# Patient Record
Sex: MALE | Race: WHITE | NOT HISPANIC OR LATINO | Employment: UNEMPLOYED | URBAN - METROPOLITAN AREA
[De-identification: names, ages, dates, MRNs, and addresses within clinical notes are randomized per-mention and may not be internally consistent; named-entity substitution may affect disease eponyms.]

---

## 2019-03-03 ENCOUNTER — TRANSFERRED RECORDS (OUTPATIENT)
Dept: HEALTH INFORMATION MANAGEMENT | Facility: CLINIC | Age: 49
End: 2019-03-03

## 2019-03-07 ENCOUNTER — TRANSFERRED RECORDS (OUTPATIENT)
Dept: HEALTH INFORMATION MANAGEMENT | Facility: CLINIC | Age: 49
End: 2019-03-07

## 2019-03-15 ENCOUNTER — TRANSFERRED RECORDS (OUTPATIENT)
Dept: HEALTH INFORMATION MANAGEMENT | Facility: CLINIC | Age: 49
End: 2019-03-15

## 2019-05-06 ENCOUNTER — TRANSFERRED RECORDS (OUTPATIENT)
Dept: HEALTH INFORMATION MANAGEMENT | Facility: CLINIC | Age: 49
End: 2019-05-06

## 2019-08-22 ENCOUNTER — TRANSFERRED RECORDS (OUTPATIENT)
Dept: HEALTH INFORMATION MANAGEMENT | Facility: CLINIC | Age: 49
End: 2019-08-22

## 2019-09-04 ENCOUNTER — TRANSFERRED RECORDS (OUTPATIENT)
Dept: HEALTH INFORMATION MANAGEMENT | Facility: CLINIC | Age: 49
End: 2019-09-04

## 2019-09-04 ENCOUNTER — MEDICAL CORRESPONDENCE (OUTPATIENT)
Dept: HEALTH INFORMATION MANAGEMENT | Facility: CLINIC | Age: 49
End: 2019-09-04

## 2019-09-10 ENCOUNTER — TRANSFERRED RECORDS (OUTPATIENT)
Dept: HEALTH INFORMATION MANAGEMENT | Facility: CLINIC | Age: 49
End: 2019-09-10

## 2019-09-11 ENCOUNTER — REFERRAL (OUTPATIENT)
Dept: TRANSPLANT | Facility: CLINIC | Age: 49
End: 2019-09-11

## 2019-09-11 DIAGNOSIS — K70.30 ALCOHOLIC CIRRHOSIS OF LIVER WITHOUT ASCITES (H): ICD-10-CM

## 2019-09-11 DIAGNOSIS — K70.30 CIRRHOSIS, ALCOHOLIC (H): ICD-10-CM

## 2019-09-11 DIAGNOSIS — B19.20 HEPATITIS C: ICD-10-CM

## 2019-09-11 NOTE — LETTER
9/23/19    Gerald Price  [address] [facility] [address]    Dear Gerald,    Thank you for your interest in the Transplant Center at [facility], [facility]. We look forward to being a part of your care team and assisting you through the transplant process.    As we discussed, your transplant coordinator is Kristy Angela (Liver).  You may call your coordinator at any time with questions or concerns call [phone].    Please complete the following.    1. Fill out and return the enclosed forms    Authorization for Electronic Communication    Authorization to Discuss Protected Health Information    Authorization for Release of Protected Health Information    2. Sign up for:    [employer], access to your electronic medical record (see enclosed pamphlet)    [employer], a transplant education website    You can use these tools to learn more about your transplant, communicate with your care team, and track your medical details      Sincerely,    Solid Organ Transplant  [facility], [facility]    cc: Terry Radford MD

## 2019-09-17 VITALS — WEIGHT: 138 LBS | BODY MASS INDEX: 22.18 KG/M2 | HEIGHT: 66 IN

## 2019-09-17 ASSESSMENT — MIFFLIN-ST. JEOR: SCORE: 1438.71

## 2019-09-17 NOTE — TELEPHONE ENCOUNTER
Patient was asked the following questions during liver intake call.     Referring Provider: Dr. Terry Radford   Referring Diagnosis:ETOH Cirrhosis   PCP: Dr. David Brian     1)Do you know why you have liver disease: Yes             If Alcoholic Cirrhosis is present when was your last drink: January 2019             Have you ever been through treatment for alcohol: Yes  2) Presence of Ascites: Pt states not in the past several months.  Paracentesis: No  3) Presence of Hepatic Encephalopathy: No Medications: Yes lactulose.   4) History of GI Bleeding: No  5) EGD: Yes at [facility]   6) Colonoscopy: No  7) MELD Score: No Labs   8) Insurance information: BCBS/Medicaid        Policy larsen: Self       Subscriber/policy/ID number: [id number]            Group Number: [id number]    Referral intake process completed.  Patient is aware that after financial approval is received, medical records will be requested.   Patient confirmed for a callback from transplant coordinator on 9/19/2019.  Tentative evaluation date TBD.    Confirmed coordinator will discuss evaluation process in more detail at the time of their call.   Patient is aware of the need to arrange age appropriate cancer screening, vaccinations, and dental care.  Reminded patient to complete questionnaire, complete medical records release, and review packet prior to evaluation visit .  Assessed patient for special needs (ie--wheelchair, assistance, guardian, and ):  No  Patient instructed to call [phone] with questions.     KIM Heath, LPN   Solid Organ Transplant

## 2019-09-20 NOTE — TELEPHONE ENCOUNTER
"Referred by: [facility], [facility]    Recent hospitalization: 1/2/2019 GIB, 1/23-GIB, 1/26 hyponatremia/ascites, 1/28 anxiety, hyponatremia left AMA, 2/16 ascites, 3/3 ascites    47 yo with alcoholic/HCV cirrhosis who reports diagnosed in 1/2019 and quit ETOH then. 3 wks HCV treatment. No CD or AA.      MELD 24 in 3/2019, this month 14-16  Bili T 3.2-5.1, INR 1.3, normal kidney funciton    Ascites - +  Taps - last para in 3/2019  HE - +  GIB - +  EGD - \"EGD done 1/6 showing some portal hypertensive gastropathy.  Colonoscopy - 1/23/19  \"flex sig which showed prominent internal hemorrhoids\"      and re .     PMH:  anxiety, RLS, hand-squamous cell     Surgical History: carpel tunnel    Nicotine:  +          Plan: soft evaluation, patient is hopeful is continues to improve and not ready for a transplant evaluation.      Contacted patient and introduced myself as their Transplant Coordinator, also introduced the role of the Transplant Coordinator in the transplant process.  Explained the purpose of this call including reviewing next steps and answering questions.    "

## 2019-09-24 ENCOUNTER — DOCUMENTATION ONLY (OUTPATIENT)
Dept: TRANSPLANT | Facility: CLINIC | Age: 49
End: 2019-09-24

## 2019-09-24 ENCOUNTER — ANCILLARY PROCEDURE (OUTPATIENT)
Dept: ULTRASOUND IMAGING | Facility: CLINIC | Age: 49
End: 2019-09-24
Attending: INTERNAL MEDICINE
Payer: COMMERCIAL

## 2019-09-24 ENCOUNTER — COMMITTEE REVIEW (OUTPATIENT)
Dept: TRANSPLANT | Facility: CLINIC | Age: 49
End: 2019-09-24

## 2019-09-24 ENCOUNTER — ALLIED HEALTH/NURSE VISIT (OUTPATIENT)
Dept: TRANSPLANT | Facility: CLINIC | Age: 49
End: 2019-09-24
Attending: INTERNAL MEDICINE
Payer: COMMERCIAL

## 2019-09-24 ENCOUNTER — OFFICE VISIT (OUTPATIENT)
Dept: GASTROENTEROLOGY | Facility: CLINIC | Age: 49
End: 2019-09-24
Attending: INTERNAL MEDICINE
Payer: COMMERCIAL

## 2019-09-24 ENCOUNTER — TELEPHONE (OUTPATIENT)
Dept: TRANSPLANT | Facility: CLINIC | Age: 49
End: 2019-09-24

## 2019-09-24 VITALS
TEMPERATURE: 98.1 F | WEIGHT: 142 LBS | HEIGHT: 66 IN | OXYGEN SATURATION: 98 % | DIASTOLIC BLOOD PRESSURE: 74 MMHG | HEART RATE: 69 BPM | SYSTOLIC BLOOD PRESSURE: 111 MMHG | BODY MASS INDEX: 22.82 KG/M2

## 2019-09-24 DIAGNOSIS — B19.20 HEPATITIS C: ICD-10-CM

## 2019-09-24 DIAGNOSIS — K74.60 CIRRHOSIS OF LIVER (H): Primary | ICD-10-CM

## 2019-09-24 DIAGNOSIS — K70.30 ALCOHOLIC CIRRHOSIS OF LIVER WITHOUT ASCITES (H): ICD-10-CM

## 2019-09-24 DIAGNOSIS — K70.30 ALCOHOLIC CIRRHOSIS OF LIVER WITHOUT ASCITES (H): Primary | ICD-10-CM

## 2019-09-24 DIAGNOSIS — K70.30 CIRRHOSIS, ALCOHOLIC (H): ICD-10-CM

## 2019-09-24 DIAGNOSIS — B18.2 CHRONIC HEPATITIS C WITHOUT HEPATIC COMA (H): ICD-10-CM

## 2019-09-24 LAB
ABO + RH BLD: NORMAL
ABO + RH BLD: NORMAL
AFP SERPL-MCNC: 33.1 UG/L (ref 0–8)
ALBUMIN SERPL-MCNC: 2.8 G/DL (ref 3.4–5)
ALBUMIN UR-MCNC: NEGATIVE MG/DL
ALP SERPL-CCNC: 127 U/L (ref 40–150)
ALT SERPL W P-5'-P-CCNC: 44 U/L (ref 0–70)
ANION GAP SERPL CALCULATED.3IONS-SCNC: 6 MMOL/L (ref 3–14)
APPEARANCE UR: CLEAR
AST SERPL W P-5'-P-CCNC: 50 U/L (ref 0–45)
BILIRUB DIRECT SERPL-MCNC: 1.3 MG/DL (ref 0–0.2)
BILIRUB SERPL-MCNC: 2.2 MG/DL (ref 0.2–1.3)
BILIRUB UR QL STRIP: NEGATIVE
BLD GP AB SCN SERPL QL: NORMAL
BLOOD BANK CMNT PATIENT-IMP: NORMAL
BUN SERPL-MCNC: 12 MG/DL (ref 7–30)
CALCIUM SERPL-MCNC: 8.4 MG/DL (ref 8.5–10.1)
CHLORIDE SERPL-SCNC: 107 MMOL/L (ref 94–109)
CO2 SERPL-SCNC: 25 MMOL/L (ref 20–32)
COLOR UR AUTO: YELLOW
CREAT SERPL-MCNC: 0.63 MG/DL (ref 0.66–1.25)
ERYTHROCYTE [DISTWIDTH] IN BLOOD BY AUTOMATED COUNT: 17.6 % (ref 10–15)
GFR SERPL CREATININE-BSD FRML MDRD: >90 ML/MIN/{1.73_M2}
GLUCOSE SERPL-MCNC: 94 MG/DL (ref 70–99)
GLUCOSE UR STRIP-MCNC: NEGATIVE MG/DL
HCT VFR BLD AUTO: 34.7 % (ref 40–53)
HGB BLD-MCNC: 11.9 G/DL (ref 13.3–17.7)
HGB UR QL STRIP: NEGATIVE
INR PPP: 1.56 (ref 0.86–1.14)
KETONES UR STRIP-MCNC: 5 MG/DL
LEUKOCYTE ESTERASE UR QL STRIP: NEGATIVE
MCH RBC QN AUTO: 33.1 PG (ref 26.5–33)
MCHC RBC AUTO-ENTMCNC: 34.3 G/DL (ref 31.5–36.5)
MCV RBC AUTO: 97 FL (ref 78–100)
MUCOUS THREADS #/AREA URNS LPF: PRESENT /LPF
NITRATE UR QL: NEGATIVE
PH UR STRIP: 6 PH (ref 5–7)
PLATELET # BLD AUTO: 70 10E9/L (ref 150–450)
POTASSIUM SERPL-SCNC: 4.6 MMOL/L (ref 3.4–5.3)
PROT SERPL-MCNC: 6.5 G/DL (ref 6.8–8.8)
RBC # BLD AUTO: 3.59 10E12/L (ref 4.4–5.9)
RBC #/AREA URNS AUTO: <1 /HPF (ref 0–2)
SODIUM SERPL-SCNC: 138 MMOL/L (ref 133–144)
SOURCE: ABNORMAL
SP GR UR STRIP: 1.02 (ref 1–1.03)
SPECIMEN EXP DATE BLD: NORMAL
UROBILINOGEN UR STRIP-MCNC: 0 MG/DL (ref 0–2)
WBC # BLD AUTO: 5.8 10E9/L (ref 4–11)
WBC #/AREA URNS AUTO: <1 /HPF (ref 0–5)

## 2019-09-24 PROCEDURE — 86900 BLOOD TYPING SEROLOGIC ABO: CPT | Performed by: INTERNAL MEDICINE

## 2019-09-24 PROCEDURE — 86901 BLOOD TYPING SEROLOGIC RH(D): CPT | Performed by: INTERNAL MEDICINE

## 2019-09-24 PROCEDURE — 80076 HEPATIC FUNCTION PANEL: CPT | Performed by: INTERNAL MEDICINE

## 2019-09-24 PROCEDURE — 82105 ALPHA-FETOPROTEIN SERUM: CPT | Performed by: INTERNAL MEDICINE

## 2019-09-24 PROCEDURE — 81001 URINALYSIS AUTO W/SCOPE: CPT | Performed by: INTERNAL MEDICINE

## 2019-09-24 PROCEDURE — 85027 COMPLETE CBC AUTOMATED: CPT | Performed by: INTERNAL MEDICINE

## 2019-09-24 PROCEDURE — 86850 RBC ANTIBODY SCREEN: CPT | Performed by: INTERNAL MEDICINE

## 2019-09-24 PROCEDURE — 80048 BASIC METABOLIC PNL TOTAL CA: CPT | Performed by: INTERNAL MEDICINE

## 2019-09-24 PROCEDURE — 85610 PROTHROMBIN TIME: CPT | Performed by: INTERNAL MEDICINE

## 2019-09-24 PROCEDURE — 86886 COOMBS TEST INDIRECT TITER: CPT | Performed by: INTERNAL MEDICINE

## 2019-09-24 PROCEDURE — 80307 DRUG TEST PRSMV CHEM ANLYZR: CPT | Performed by: INTERNAL MEDICINE

## 2019-09-24 ASSESSMENT — MIFFLIN-ST. JEOR: SCORE: 1456.86

## 2019-09-24 ASSESSMENT — PAIN SCALES - GENERAL: PAINLEVEL: NO PAIN (0)

## 2019-09-24 NOTE — COMMITTEE REVIEW
Abdominal Committee Review Note     Evaluation Date: 9/23/2019  Committee Review Date: 9/24/2019    Organ being evaluated for: Liver    Transplant Phase: Evaluation  Transplant Status: Active    Transplant Coordinator: Kristy Angela  Transplant Surgeon:       Referring Physician: Terry aRdford    Primary Diagnosis: Alcoholic Cirrhosis  Secondary Diagnosis:     Committee Review Members  Yolanda Ibrahim, RD   Pharmacy Kunal Aguayo, [facility]    - Clinical Malcom Willingham, NELA, Rina Armas, [facility]   Transplant Royce Mcgarry MD, Jr Cyril Allen, JEREMIAH, Ramandeep West, APRN CNP, Kristy Angela, RN, Hugo Vincent MD, Thomas Howell MD, Evelyne Pantoja, JEREMIAH, Kayden Gonzalez MD, Thomas M. Leventhal, MD, Chip Peterson MD       Transplant Eligibility: Cirrhosis with MELD, ETOH    Committee Review Decision: Declined    Relative Contraindications: patient not interested in transplant evaluation at this time.. MELD improving and well compensated.    Absolute Contraindications: None    Committee Chair Hugo Vincent MD verbally attested to the committee's decision.    Committee Discussion Details:   MRI, AFP increasing  Close transplant evaluation

## 2019-09-24 NOTE — LETTER
9/24/2019       RE: Gerald Price  [address] [facility] [address]     Dear Colleague,    Thank you for referring your patient, Gerald Price, to the [facility] HEPATOLOGY at [facility]. Please see a copy of my visit note below.    [facility]    Hepatology New Patient Visit    Referring provider:  Referred Self      CONSULTING HEALTHCARE PROFESSIONAL:  Terry Radford MD, [facility]       CHIEF COMPLAINT/REASON FOR VISIT:  Chronic liver disease related with alcohol and hepatitis C.      HISTORY OF PRESENT ILLNESS:  Mr. Gerald Price is a 48-year-old white male with a history of polysubstance abuse.  In 01/2019, he presented with a GI bleed.  At that time he was admitted to the hospital, and in fact, he did not have any esophageal varices, but had some portal hypertensive gastropathy.  He also had a flexible sigmoidoscopy that was done at the same time, and it showed that he had prominent hemorrhoids.  Otherwise, no other source of bleeding was identified.  He also later in that initial presentation developed ascites and fluid retention.  He did require therapeutic paracentesis.  As he abstained from alcohol, he improved.  According to him and his significant other, it appears that he was sick the first part of the year until April.  He did have, also, during his first admission there an altered mental status.  He was intubated.  He had withdrawals and had even seizures, and he was in the ICU.  After April, he improved clinically.  His fluid retention decreased, and currently he is doing quite well with no ascites that can be elicited with the physical examination; in fact, his ultrasound now done here does not show any fluid, and the Dopplers do not show any portal vein thrombosis.      As far as other symptoms are concerned, he denies today any significant abdominal pain.  He has no nausea or vomiting.  His weight has remained  "stable.  He is moving his bowels like 3 times a day, and he insists that he is not drinking.     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       Past Surgical History:   Procedure Laterality Date     ORTHOPEDIC SURGERY      carpel tunnel          Medications  Prior to Admission medications    Not on File       Allergies  Allergies   Allergen Reactions     Naproxen Rash       Family hx Social hx   Family History   Problem Relation Age of Onset     Multiple myeloma Mother      Breast Cancer Mother      Cirrhosis Father       Social History     Tobacco Use     Smoking status: Current Every Day Smoker     Packs/day: 20.00     Types: Cigarettes     Smokeless tobacco: Never Used     Tobacco comment: 0.5 daily    Substance Use Topics     Alcohol use: Not Currently     Comment: Sober since 1/2/2019     Drug use: Yes     Types: Marijuana     Comment: 1-2 week           REVIEW OF SYSTEMS:  Mr. Price denies having any recent infections.  He has significant fatigue.  No headaches.  He had seizures when he was in the hospital in January, at which time he had what appeared to be alcoholic hepatitis.  He denies any cough or shortness of breath or chest pain.  He has some anemia and easily bruises.  He has alcohol abuse and he had other polysubstance abuse also.  He has no diabetes.  No thyroid issues.  He has no eye issues.  He has bilateral hearing loss.  He denies any skin issue, except that his skin is darker, most likely related with him doing a lot of outdoor work.     Examination  /74   Pulse 69   Temp 98.1  F (36.7  C) (Oral)   Ht 1.676 m (5' 6\")   Wt 64.4 kg (142 lb)   SpO2 98%   BMI 22.92 kg/m     Body mass index is 22.92 kg/m .    Gen- " well, NAD, A+Ox3, normal color  Eye- EOMI  ENT- MMM, normal oropharynx  Lym- no palpable lymphadenopathy  CVS- S1, S2 normal, no added sounds, RRR  RS- CTA  Abd- Not distended. No shifting dullness.  Extr- pulses good, no GLADYS  MS- hands normal- no clubbing  Neuro- A+Ox3, no asterixis  Skin- no rash or jaundice  Psych- normal mood    Laboratory  Lab Results   Component Value Date     09/24/2019    POTASSIUM 4.6 09/24/2019    CHLORIDE 107 09/24/2019    CO2 25 09/24/2019    BUN 12 09/24/2019    CR 0.63 09/24/2019       Lab Results   Component Value Date    BILITOTAL 2.2 09/24/2019    ALT 44 09/24/2019    AST 50 09/24/2019    ALKPHOS 127 09/24/2019       Lab Results   Component Value Date    ALBUMIN 2.8 09/24/2019    PROTTOTAL 6.5 09/24/2019        Lab Results   Component Value Date    WBC 5.8 09/24/2019    HGB 11.9 09/24/2019    MCV 97 09/24/2019    PLT 70 09/24/2019       Lab Results   Component Value Date    INR 1.56 09/24/2019         Radiology    ASSESSMENT AND PLAN:  Cirrhosis of the liver related with hepatitis C and alcohol.        Mr. Price in January when he presented with acute decompensation of his liver disease, in my opinion, it might have been mostly hepatitis related with alcohol.  As he abstained from alcohol, he improved.  He had fluid retention and required therapeutic paracentesis, which is no longer the case.  Although his imaging shows that he has cirrhosis of the liver, he on endoscopy did not have esophageal varices. His MELD score is 15 now.  It was much higher when he was initially seen.  Another observation is that he had a history of polysubstance abuse, and he did acquire the hepatitis C related with that.  In fact, he is currently on Epclusa, which is sofosbuvir and velpatasvir, and he  has done so far  a month.  We expect him to respond.  I do not have his genotype, but the medication is pangenotypic.  He will need to do, also, a chemical dependency evaluation and treatment.  As far  as transplantation is concerned, he is only 50.  His MELD score is only 15.  We explained to him what that entails and that his MELD score should be higher to be listed and that he will go through a complete process of evaluation, including seeing Cardiology.  He wants to postpone.  He can still follow with Dr. Terry Radford first of all for finishing his hepatitis C treatment and checking if he achieves a sustained virological response, and this might also lead to further improvement in his liver disease.  Besides that, he will need to do treatment for his alcoholism.  He needs to quit tobacco, as he has smoked from childhood and has smoked 2 packs per day for quite a long time, so this needs to be addressed by Dr. Terry Radford.  He had a mild elevation of his alpha fetoproteins, and we do not have anything to compare with.  I am not expecting him to have any cancer, but we will get at least a baseline MRI with contrast, which we have already ordered.  His ultrasound from today did not show any focal lesions.      This was a 1 hour visit, of which more than 50% was spent in explaining to the patient what our plan of care was.  We answered all his questions.  He will follow with Dr. Terry Radford.      cc:     Terry Radford MD    [facility]   [address] [place] [id number]     Hugo Vincent MD  Hepatology  [facility]        Again, thank you for allowing me to participate in the care of your patient.      Sincerely,    Hugo Vincent MD

## 2019-09-24 NOTE — PROGRESS NOTES
Left message with referring provider, Dr. Radford, that patient is not interested in transplant at this time with an improving MELD of 15. Plan for patient to follow up with Dr. Vincent in 6 months. He did have a AFP done which was 33.1. Contact information provided if provider has any questions or concerns.

## 2019-09-24 NOTE — PROGRESS NOTES
Outpatient MNT: Liver Transplant Evaluation    Current BMI: 22.3 (HT 66 in,  lbs/63 kg)  BMI is within criteria of <40 for liver transplant     Time Spent: 15 minutes  Visit Type: Initial  Referring Physician: Carlton   Pt accompanied by: self     Medical dx associated with RD referral  - alcohol cirrhosis  - hep C     History of previous txp: none     Nutrition Assessment  Pt cooks for self. He reports not following any dietary restrictions (ie low sodium).     Appetite: improving, but not yet back to baseline    Vitamins, Supplements, Pertinent Meds: none currently   Herbal Medicines/Supplements: none     Diet Recall  Breakfast Fruit, bagel with CC and jelly    Lunch Occasional protein bar, fruit    Dinner Pizza, roast, fruit    Snacks Fruit    Beverages 40 oz milk/day, water, juice, 3-4 Body Armor/day (20 mg sodium each), makes smoothies with yogurt and fruit most days (dislikes protein powder)   Dining out A few times/week- fast food for lunch      Physical Activity  Works out (cardio; kick boxing) at least 1x/week      Anthropometrics  Height:   66 in   BMI:    22.3    Weight Status:Normal BMI   Weight:  138 lbs (9/17)            IBW (lb): 142  % IBW: 97    Wt Hx: Pt reports no edema (this has resolved). Pt reports losing weight earlier this year (Jan) after being sick, but has regained 75% of what he has lost. Weight per Care Everywhere of 164 lbs (1/2019). Difficult to assess dry weight vs fluid loss.     Adj/dosing BW: 138 lbs/63 kg         Malnutrition  % Intake: Decreased intake does not meet criteria for malnutrition   % Weight Loss: difficult to determine with ?prior fluid status   Subcutaneous Fat Loss: Mild  Muscle Loss: Mild  Fluid Accumulation/Edema: None noted  Malnutrition Diagnosis: Non-Severe malnutrition in the context of chronic illness     Estimated Nutrition Needs  Energy  [id number]     (25-30-35 kcal/kg for maintenance)     Protein  63-76    (1-1.2 g/kg for increased needs)            Fluid  1 ml/kcal or per MD        Micronutrient   Na+: <2000 mg/day            Nutrition Diagnosis  Food and nutrition related knowledge deficit r/t pre liver transplant eval AEB pt verbalized not hearing pre/post transplant diet guidelines.    Nutrition Intervention  Nutrition education provided:  Discussed sodium intake (low sodium foods and drinks, seasoning food without salt and tips for low sodium diet).    Reviewed adequate protein intake. Encouraged receiving protein from both animal and plant based sources.     Reviewed post txp diet guidelines in brief (will review in further detail post txp):  (1) Review of proper food safety measures d/t immunosuppressant therapy post-op and increased risk for food-borne illness    (2) Avoid the following post txp d/t risk for rejection, unknown effects on the organs, and/or potential interactions with immunosuppressants:  - Herbal, Chinese, holistic, chiropractic, natural, alternative medicines and supplements  - Detoxes and cleanses  - Weight loss pills  - Protein powders or other products with extracts or herbs (ie green tea extract)    (3) Med regimen and possible side effects    Patient Understanding: Pt verbalized understanding of education provided.  Expected Compliance: Good  Follow-Up Plans: PRN     Nutrition Goals  1. Limit Na+ <2000mg/day  2. Pt to verbalize understanding of 3 aspects of post txp education provided  3. Minimum of 63 g protein/day    Provided pt with contact info.   Lashay Ibrahim RD, LD  Pgr [phone]

## 2019-09-24 NOTE — PROGRESS NOTES
[facility]    Hepatology New Patient Visit    Referring provider:  Referred Self      CONSULTING HEALTHCARE PROFESSIONAL:  Terry Radford MD, [facility]       CHIEF COMPLAINT/REASON FOR VISIT:  Chronic liver disease related with alcohol and hepatitis C.      HISTORY OF PRESENT ILLNESS:  Mr. Gerald Price is a 48-year-old white male with a history of polysubstance abuse.  In 01/2019, he presented with a GI bleed.  At that time he was admitted to the hospital, and in fact, he did not have any esophageal varices, but had some portal hypertensive gastropathy.  He also had a flexible sigmoidoscopy that was done at the same time, and it showed that he had prominent hemorrhoids.  Otherwise, no other source of bleeding was identified.  He also later in that initial presentation developed ascites and fluid retention.  He did require therapeutic paracentesis.  As he abstained from alcohol, he improved.  According to him and his significant other, it appears that he was sick the first part of the year until April.  He did have, also, during his first admission there an altered mental status.  He was intubated.  He had withdrawals and had even seizures, and he was in the ICU.  After April, he improved clinically.  His fluid retention decreased, and currently he is doing quite well with no ascites that can be elicited with the physical examination; in fact, his ultrasound now done here does not show any fluid, and the Dopplers do not show any portal vein thrombosis.      As far as other symptoms are concerned, he denies today any significant abdominal pain.  He has no nausea or vomiting.  His weight has remained stable.  He is moving his bowels like 3 times a day, and he insists that he is not drinking.     Medical hx Surgical hx   Past Medical History:   Diagnosis Date     Alcoholic cirrhosis (H)      Anxiety      Ascites      Chronic hepatitis C with cirrhosis (H)     Hep C 1/2019      "Depressive disorder      GIB (gastrointestinal bleeding)      HE (hepatic encephalopathy) (H)      Squamous cell cancer of skin of hand       Past Surgical History:   Procedure Laterality Date     ORTHOPEDIC SURGERY      carpel tunnel          Medications  Prior to Admission medications    Not on File       Allergies  Allergies   Allergen Reactions     Naproxen Rash       Family hx Social hx   Family History   Problem Relation Age of Onset     Multiple myeloma Mother      Breast Cancer Mother      Cirrhosis Father       Social History     Tobacco Use     Smoking status: Current Every Day Smoker     Packs/day: 20.00     Types: Cigarettes     Smokeless tobacco: Never Used     Tobacco comment: 0.5 daily    Substance Use Topics     Alcohol use: Not Currently     Comment: Sober since 1/2/2019     Drug use: Yes     Types: Marijuana     Comment: 1-2 week           REVIEW OF SYSTEMS:  Mr. Price denies having any recent infections.  He has significant fatigue.  No headaches.  He had seizures when he was in the hospital in January, at which time he had what appeared to be alcoholic hepatitis.  He denies any cough or shortness of breath or chest pain.  He has some anemia and easily bruises.  He has alcohol abuse and he had other polysubstance abuse also.  He has no diabetes.  No thyroid issues.  He has no eye issues.  He has bilateral hearing loss.  He denies any skin issue, except that his skin is darker, most likely related with him doing a lot of outdoor work.     Examination  /74   Pulse 69   Temp 98.1  F (36.7  C) (Oral)   Ht 1.676 m (5' 6\")   Wt 64.4 kg (142 lb)   SpO2 98%   BMI 22.92 kg/m    Body mass index is 22.92 kg/m .    Gen- well, NAD, A+Ox3, normal color  Eye- EOMI  ENT- MMM, normal oropharynx  Lym- no palpable lymphadenopathy  CVS- S1, S2 normal, no added sounds, RRR  RS- CTA  Abd- Not distended. No shifting dullness.  Extr- pulses good, no GLADYS  MS- hands normal- no clubbing  Neuro- A+Ox3, no " asterixis  Skin- no rash or jaundice  Psych- normal mood    Laboratory  Lab Results   Component Value Date     09/24/2019    POTASSIUM 4.6 09/24/2019    CHLORIDE 107 09/24/2019    CO2 25 09/24/2019    BUN 12 09/24/2019    CR 0.63 09/24/2019       Lab Results   Component Value Date    BILITOTAL 2.2 09/24/2019    ALT 44 09/24/2019    AST 50 09/24/2019    ALKPHOS 127 09/24/2019       Lab Results   Component Value Date    ALBUMIN 2.8 09/24/2019    PROTTOTAL 6.5 09/24/2019        Lab Results   Component Value Date    WBC 5.8 09/24/2019    HGB 11.9 09/24/2019    MCV 97 09/24/2019    PLT 70 09/24/2019       Lab Results   Component Value Date    INR 1.56 09/24/2019         Radiology    ASSESSMENT AND PLAN:  Cirrhosis of the liver related with hepatitis C and alcohol.        Mr. Price in January when he presented with acute decompensation of his liver disease, in my opinion, it might have been mostly hepatitis related with alcohol.  As he abstained from alcohol, he improved.  He had fluid retention and required therapeutic paracentesis, which is no longer the case.  Although his imaging shows that he has cirrhosis of the liver, he on endoscopy did not have esophageal varices. His MELD score is 15 now.  It was much higher when he was initially seen.  Another observation is that he had a history of polysubstance abuse, and he did acquire the hepatitis C related with that.  In fact, he is currently on Epclusa, which is sofosbuvir and velpatasvir, and he  has done so far  a month.  We expect him to respond.  I do not have his genotype, but the medication is pangenotypic.  He will need to do, also, a chemical dependency evaluation and treatment.  As far as transplantation is concerned, he is only 50.  His MELD score is only 15.  We explained to him what that entails and that his MELD score should be higher to be listed and that he will go through a complete process of evaluation, including seeing Cardiology.  He wants to  postpone.  He can still follow with Dr. Terry Radford first of all for finishing his hepatitis C treatment and checking if he achieves a sustained virological response, and this might also lead to further improvement in his liver disease.  Besides that, he will need to do treatment for his alcoholism.  He needs to quit tobacco, as he has smoked from childhood and has smoked 2 packs per day for quite a long time, so this needs to be addressed by Dr. Terry Radford.  He had a mild elevation of his alpha fetoproteins, and we do not have anything to compare with.  I am not expecting him to have any cancer, but we will get at least a baseline MRI with contrast, which we have already ordered.  His ultrasound from today did not show any focal lesions.      This was a 1 hour visit, of which more than 50% was spent in explaining to the patient what our plan of care was.  We answered all his questions.  He will follow with Dr. Terry Radford.      cc:     Terry Radford MD    [facility]   [address] [place] [id number]     Hugo Vincent MD  Hepatology  [facility]

## 2019-09-24 NOTE — TELEPHONE ENCOUNTER
Left message with Per informing him he will be contcated for imaging which he will need at least annually due to cirrhosis.

## 2019-09-25 LAB
BLD GP AB SCN TITR SERPL: NORMAL {TITER}
ETHYL GLUCURONIDE UR QL: NEGATIVE

## 2019-09-27 ENCOUNTER — TELEPHONE (OUTPATIENT)
Dept: TRANSPLANT | Facility: CLINIC | Age: 49
End: 2019-09-27

## 2019-11-18 ENCOUNTER — ANCILLARY PROCEDURE (OUTPATIENT)
Dept: MRI IMAGING | Facility: CLINIC | Age: 49
End: 2019-11-18
Attending: INTERNAL MEDICINE
Payer: COMMERCIAL

## 2019-11-18 DIAGNOSIS — K74.60 CIRRHOSIS OF LIVER (H): ICD-10-CM

## 2019-11-18 PROCEDURE — A9585 GADOBUTROL INJECTION: HCPCS | Mod: JW | Performed by: INTERNAL MEDICINE

## 2019-11-18 PROCEDURE — 74183 MRI ABD W/O CNTR FLWD CNTR: CPT | Performed by: RADIOLOGY

## 2019-11-18 RX ORDER — GADOBUTROL 604.72 MG/ML
7.5 INJECTION INTRAVENOUS ONCE
Status: DISCONTINUED | OUTPATIENT
Start: 2019-11-18 | End: 2019-11-18 | Stop reason: CLARIF

## 2019-11-18 RX ORDER — GADOBUTROL 604.72 MG/ML
7.5 INJECTION INTRAVENOUS ONCE
Status: COMPLETED | OUTPATIENT
Start: 2019-11-18 | End: 2019-11-18

## 2019-11-18 RX ADMIN — GADOBUTROL 6.5 ML: 604.72 INJECTION INTRAVENOUS at 11:50

## 2022-01-03 ENCOUNTER — HOSPITAL ENCOUNTER (EMERGENCY)
Facility: HOSPITAL | Age: 52
Discharge: HOME | End: 2022-01-03
Attending: EMERGENCY MEDICINE | Admitting: EMERGENCY MEDICINE
Payer: COMMERCIAL

## 2022-01-03 VITALS
SYSTOLIC BLOOD PRESSURE: 130 MMHG | BODY MASS INDEX: 25.76 KG/M2 | HEIGHT: 68 IN | WEIGHT: 170 LBS | TEMPERATURE: 97.9 F | RESPIRATION RATE: 16 BRPM | HEART RATE: 72 BPM | DIASTOLIC BLOOD PRESSURE: 81 MMHG | OXYGEN SATURATION: 96 %
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
## 2022-01-03 DIAGNOSIS — S01.511A COMPLICATED LACERATION OF LIP, INITIAL ENCOUNTER: ICD-10-CM

## 2022-01-03 PROCEDURE — 90471 IMMUNIZATION ADMIN: CPT | Performed by: EMERGENCY MEDICINE

## 2022-01-03 PROCEDURE — 90715 TDAP VACCINE 7 YRS/> IM: CPT | Performed by: EMERGENCY MEDICINE

## 2022-01-03 PROCEDURE — 250N000011 HC RX IP 250 OP 636: Performed by: EMERGENCY MEDICINE

## 2022-01-03 PROCEDURE — 250N000009 HC RX 250: Performed by: EMERGENCY MEDICINE

## 2022-01-03 PROCEDURE — 12051 INTMD RPR FACE/MM 2.5 CM/<: CPT

## 2022-01-03 PROCEDURE — 99283 EMERGENCY DEPT VISIT LOW MDM: CPT | Mod: 25

## 2022-01-03 PROCEDURE — 250N000013 HC RX MED GY IP 250 OP 250 PS 637: Performed by: EMERGENCY MEDICINE

## 2022-01-03 RX ORDER — HYDROCODONE BITARTRATE AND ACETAMINOPHEN 5; 325 MG/1; MG/1
1 TABLET ORAL ONCE
Status: COMPLETED | OUTPATIENT
Start: 2022-01-03 | End: 2022-01-03

## 2022-01-03 RX ORDER — HYDROCODONE BITARTRATE AND ACETAMINOPHEN 5; 325 MG/1; MG/1
1 TABLET ORAL EVERY 6 HOURS PRN
Qty: 6 TABLET | Refills: 0 | Status: SHIPPED | OUTPATIENT
Start: 2022-01-03 | End: 2022-01-06

## 2022-01-03 RX ORDER — BACITRACIN ZINC 500 [USP'U]/G
OINTMENT TOPICAL ONCE
Status: COMPLETED | OUTPATIENT
Start: 2022-01-03 | End: 2022-01-03

## 2022-01-03 RX ADMIN — BACITRACIN ZINC 1 %: 500 OINTMENT TOPICAL at 10:04

## 2022-01-03 RX ADMIN — AMOXICILLIN AND CLAVULANATE POTASSIUM 1 TABLET: 875; 125 TABLET, FILM COATED ORAL at 10:05

## 2022-01-03 RX ADMIN — CLOSTRIDIUM TETANI TOXOID ANTIGEN (FORMALDEHYDE INACTIVATED), CORYNEBACTERIUM DIPHTHERIAE TOXOID ANTIGEN (FORMALDEHYDE INACTIVATED), BORDETELLA PERTUSSIS TOXOID ANTIGEN (GLUTARALDEHYDE INACTIVATED), BORDETELLA PERTUSSIS FILAMENTOUS HEMAGGLUTININ ANTIGEN (FORMALDEHYDE INACTIVATED), BORDETELLA PERTUSSIS PERTACTIN ANTIGEN, AND BORDETELLA PERTUSSIS FIMBRIAE 2/3 ANTIGEN 0.5 ML: 5; 2; 2.5; 5; 3; 5 INJECTION, SUSPENSION INTRAMUSCULAR at 07:53

## 2022-01-03 RX ADMIN — Medication 3 ML: at 07:03

## 2022-01-03 RX ADMIN — HYDROCODONE BITARTRATE AND ACETAMINOPHEN 1 TABLET: 5; 325 TABLET ORAL at 07:02

## 2022-01-03 ASSESSMENT — MIFFLIN-ST. JEOR: SCORE: 1600.61

## 2022-01-03 NOTE — DISCHARGE INSTRUCTIONS
Your complicated lip laceration was repaired using 6 nonabsorbable sutures that will need to be removed in approximately 5 days.  Take the prescribed antibiotic Augmentin as directed to prevent infection.  Use the prescribed hydrocodone as needed for any further pain.  Apply a topical antibiotic ointment to the wound twice a day for the next 3 to 4 days.  Follow-up with your primary care physician to have the sutures removed as directed or return to ED sooner for any worsening pain, swelling, or any other new or concerning symptoms.

## 2022-01-03 NOTE — ED TRIAGE NOTES
Pt has laceration to lower lip from his parrot biting his lip. Bleeding controlled. Unsure of tetanus status.

## 2022-01-03 NOTE — ED PROVIDER NOTES
EMERGENCY DEPARTMENT ENCOUNTER      NAME: Gerald Price  AGE: 51 year old male  YOB: 1970  MRN: [id number]  EVALUATION DATE & TIME: 1/3/2022  6:23 AM    PCP: No Ref-Primary, Physician    ED PROVIDER: Deneen Estes D.O.      CHIEF COMPLAINT:  Chief Complaint   Patient presents with     Lip Laceration         FINAL IMPRESSION:  1. Complicated laceration of lip, initial encounter    2. Bitten by parrot, initial encounter          ED COURSE & MEDICAL DECISION MAKIN year old male presented to the ED for evaluation of a right lower lip laceration after he was bit by his own parrot earlier this morning.  Upon arrival to the ED the patient was noted to be hemodynamically stable.  He was somewhat uncomfortable appearing but was not in any obvious distress.  On exam there is an avulsion type injury to the right lower lip with a small to moderate size piece of the right lower lip missing on exam.  The remainder of the physical exam was unremarkable.  Following his initial evaluation LET was applied to the lip and he was given a dose of hydrocodone.  The patient's tetanus was updated.    The wound was irrigated and additional lidocaine was injected.  The complex laceration was repaired using 6 nonabsorbable sutures.  Please see the procedure note for further evaluation.  The patient tolerated the procedure well and there were no complications noted.  The patient was informed of the fact that he would likely have a scar given the avulsion type injury to the right lower lip.  Bacitracin was applied to the wound.  Patient was educated about proper wound care.  He was given a dose of Augmentin here in the ED to prevent an infection.  The patient then discharged home with Augmentin as well as hydrocodone to take as needed for any further pain for the next few days.  Patient was instructed to continue applying a topical antibiotic ointment to the laceration over the next few days.  He was instructed to  follow-up with a primary care physician to have the sutures removed in approximately 5 days or to return back to ED sooner for any worsening pain, swelling, or any other new or concerning symptoms.        6:45 AM I met with the patient for initial interview and exam. Discussed plan of care including diagnostic testing and treatment.  9:47 AM I performed the laceration repair. No complications. We discussed the plan for discharge and the patient is agreeable. Reviewed supportive cares, symptomatic treatment, outpatient follow up, and reasons to return to the Emergency Department. Patient to be discharged by ED RN.     At the conclusion of the encounter I discussed the results of all of the tests and the disposition. The questions were answered. The patient or family acknowledged understanding and was agreeable with the care plan.     MEDICATIONS GIVEN IN THE EMERGENCY:  Medications   amoxicillin-clavulanate (AUGMENTIN) 875-125 MG per tablet 1 tablet (has no administration in time range)   bacitracin ointment (has no administration in time range)   lidocaine/EPINEPHrine/tetracaine (LET) solution KIT 3 mL (3 mLs Topical Given 1/3/22 0703)   HYDROcodone-acetaminophen (NORCO) 5-325 MG per tablet 1 tablet (1 tablet Oral Given 1/3/22 0702)   Tdap (tetanus-diphtheria-acell pertussis) (ADACEL) injection 0.5 mL (0.5 mLs Intramuscular Given 1/3/22 4263)       NEW PRESCRIPTIONS STARTED AT TODAY'S ER VISIT:  New Prescriptions    AMOXICILLIN-CLAVULANATE (AUGMENTIN) 875-125 MG TABLET    Take 1 tablet by mouth 2 times daily for 7 days    HYDROCODONE-ACETAMINOPHEN (NORCO) 5-325 MG TABLET    Take 1 tablet by mouth every 6 hours as needed for pain          =================================================================    HPI  Gerald Price is a 51 year old male who presents for evaluation of right lower lip laceration.    Patient presents with right lower lip laceration after his parrot bit him prior to arrival. A piece of the  tissue below the lip was bitten out and is missing. Bleeding is controlled. He is unsure of his tetanus status. Per [facility] records, he has not had his tetanus shot before. He is allergic to ibuprofen.    I, Ky Oly am serving as a scribe to document services personally performed by Dr. Deneen Estes DO, based on my observation and the provider's statements to me. I, Dr. Deneen Estes DO attest that Ky Oly is acting in a scribe capacity, has observed my performance of the services and has documented them in accordance with my direction.      REVIEW OF SYSTEMS   Constitutional: Denies fever, chills, unintentional weight loss or fatigue   Eyes: Denies visual changes or discharge    HENT: Positive for lip laceration (right, lower). Denies sore throat, ear pain or neck pain  Respiratory: Denies cough or shortness of breath    Cardiovascular: Denies chest pain, palpitations or leg swelling  GI: Denies abdominal pain, nausea, vomiting, or dark, bloody stools.    : Denies hematuria, dysuria, or flank pain  Musculoskeletal: Denies any new back pain or new muscle/joint pains  Skin: Denies rash or wound  Neurologic: Denies current headache, new weakness, focal weakness, or sensory changes    Lymphatic: Denies swollen glands    Psychiatric: Denies depression, suicidal ideation or homicidal ideation.    Remainder of systems reviewed, unless noted in HPI all others negative.      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        PAST SURGICAL HISTORY:  Past Surgical History:   Procedure Laterality Date     ORTHOPEDIC SURGERY      carpel tunnel           CURRENT MEDICATIONS:    Prior to Admission medications    Not on File         ALLERGIES:  Allergies  "  Allergen Reactions     Ibuprofen      Naproxen Rash     Pramipexole Anxiety     Moodiness       FAMILY HISTORY:  Family History   Problem Relation Age of Onset     Multiple myeloma Mother      Breast Cancer Mother      Cirrhosis Father        SOCIAL HISTORY:   Social History     Socioeconomic History     Marital status:      Spouse name: Not on file     Number of children: Not on file     Years of education: Not on file     Highest education level: Not on file   Occupational History     Not on file   Tobacco Use     Smoking status: Current Every Day Smoker     Packs/day: 20.00     Types: Cigarettes     Smokeless tobacco: Never Used     Tobacco comment: 0.5 daily    Substance and Sexual Activity     Alcohol use: Not Currently     Comment: Sober since 1/2/2019     Drug use: Yes     Types: Marijuana     Comment: 1-2 week      Sexual activity: Yes     Partners: Female   Other Topics Concern     Parent/sibling w/ CABG, MI or angioplasty before 65F 55M? Not Asked   Social History Narrative     Not on file     Social Determinants of Health     Financial Resource Strain: Not on file   Food Insecurity: Not on file   Transportation Needs: Not on file   Physical Activity: Not on file   Stress: Not on file   Social Connections: Not on file   Intimate Partner Violence: Not on file   Housing Stability: Not on file       PHYSICAL EXAM    /81   Pulse 72   Temp 97.9  F (36.6  C) (Temporal)   Resp 16   Ht 1.727 m (5' 8\")   Wt 77.1 kg (170 lb)   SpO2 96%   BMI 25.85 kg/m    General presentation: Alert, Vital signs reviewed. No apparent distress.   HENT: 1.5 cm stellate laceration noted in right lower lip through the vermilion border.  There is a small portion of the right lower lip that is completely avulsed.    Eye: Pupils are equal and reactive to light. EOMI  Neck: The neck is supple.  Pulmonary: Currently in no acute respiratory distress.   Circulatory: Peripheral pulses are strong and equal in the bilateral " upper lower extremities.   Neurologic: Alert, oriented to person, place, and time. No gross motor or sensory deficits noted in the upper or lower extremities. Cranial nerves II through XII are grossly intact.  Musculoskeletal: No extremity tenderness. Full range of motion in all extremities. No extremity edema.   Skin: Skin color is normal. No rash. Warm. Dry to touch.                LAB:  All pertinent labs reviewed and interpreted.  Labs Ordered and Resulted from Time of ED Arrival to Time of ED Departure - No data to display    RADIOLOGY:  Reviewed all pertinent imaging. Please see official radiology reports  No orders to display       PROCEDURES:   PROCEDURE: Laceration Repair   INDICATIONS: Laceration   PROCEDURE PROVIDER: Dr Deneen Estes   SITE: Right lower lip   TYPE/SIZE: subcutaneous, stellate, clean and no foreign body visualized  1.6 cm (total length)   FUNCTIONAL ASSESSMENT: Distal sensation, circulation and motor intact   MEDICATION: Topical LET, 3 mLs of 1% Lidocaine without epinephrine   PREPARATION: irrigation with Normal saline   DEBRIDEMENT: wound explored, no foreign body found   CLOSURE:  Wound was closed in   two layers: Subcutaneous layer closed with 6 stitches of 5-0 Prolene    Total number of sutures/staples placed: 6         I, Ky Aldridge, am serving as a scribe to document services personally performed by Dr. Deneen Estes based on my observation and the provider's statements to me. Deneen BUTTS, DO attest that Ky Aldridge is acting in a scribe capacity, has observed my performance of the services and has documented them in accordance with my direction.    Deneen Estes D.O.  Emergency Medicine  [facility] EMERGENCY DEPARTMENT  [facility] [facility] [id number] [phone]  Dept: [phone]       Deneen Estes DO  01/03/22 3017

## 2022-06-22 ENCOUNTER — HOSPITAL ENCOUNTER (OUTPATIENT)
Dept: ULTRASOUND IMAGING | Facility: HOSPITAL | Age: 52
Discharge: HOME | End: 2022-06-22
Attending: INTERNAL MEDICINE | Admitting: INTERNAL MEDICINE
Payer: COMMERCIAL

## 2022-06-22 DIAGNOSIS — K70.30 ALCOHOLIC CIRRHOSIS OF LIVER WITHOUT ASCITES (H): ICD-10-CM

## 2022-06-22 PROCEDURE — 76705 ECHO EXAM OF ABDOMEN: CPT

## 2022-12-05 ENCOUNTER — HOSPITAL ENCOUNTER (OUTPATIENT)
Dept: ULTRASOUND IMAGING | Facility: HOSPITAL | Age: 52
Discharge: HOME | End: 2022-12-05
Attending: INTERNAL MEDICINE | Admitting: INTERNAL MEDICINE
Payer: COMMERCIAL

## 2022-12-05 DIAGNOSIS — K70.30 ALCOHOLIC CIRRHOSIS OF LIVER WITHOUT ASCITES (H): ICD-10-CM

## 2022-12-05 PROCEDURE — 76705 ECHO EXAM OF ABDOMEN: CPT

## 2023-07-19 ENCOUNTER — HOSPITAL ENCOUNTER (OUTPATIENT)
Dept: ULTRASOUND IMAGING | Facility: HOSPITAL | Age: 53
Discharge: HOME | End: 2023-07-19
Attending: INTERNAL MEDICINE | Admitting: INTERNAL MEDICINE
Payer: COMMERCIAL

## 2023-07-19 DIAGNOSIS — K74.60 CIRRHOSIS OF LIVER WITHOUT ASCITES, UNSPECIFIED HEPATIC CIRRHOSIS TYPE (H): ICD-10-CM

## 2023-07-19 DIAGNOSIS — R13.19 ESOPHAGEAL DYSPHAGIA: ICD-10-CM

## 2023-07-19 PROCEDURE — 76705 ECHO EXAM OF ABDOMEN: CPT

## 2023-09-06 NOTE — TELEPHONE ENCOUNTER
Please call S/O Alice Delgado # [phone] she is helping with the intake referral    Verbal/written post procedure instructions were given to patient/caregiver./Instructed patient/caregiver to follow-up with primary care physician./Instructed patient/caregiver regarding signs and symptoms of infection./Keep the cast/splint/dressing clean and dry.